# Patient Record
Sex: FEMALE | Race: WHITE
[De-identification: names, ages, dates, MRNs, and addresses within clinical notes are randomized per-mention and may not be internally consistent; named-entity substitution may affect disease eponyms.]

---

## 2018-07-10 ENCOUNTER — HOSPITAL ENCOUNTER (OUTPATIENT)
Dept: HOSPITAL 47 - RADXRMAIN | Age: 28
Discharge: HOME | End: 2018-07-10
Payer: COMMERCIAL

## 2018-07-10 DIAGNOSIS — M54.2: Primary | ICD-10-CM

## 2018-07-10 PROCEDURE — 72050 X-RAY EXAM NECK SPINE 4/5VWS: CPT

## 2018-07-10 NOTE — XR
Cervical spine

 

HISTORY: Pain

 

5 views of cervical spine

 

There is no significant foraminal encroachment. Cervical vertebral bodies show preserved height and b
one mineralization. There is reversal the normal cervical lordosis possibly due to muscle spasm. Mini
mal anterolisthesis grade 1 C2-3. Disc spaces relatively maintained. Prevertebral soft tissues are no
rmal.

 

IMPRESSION: Loss of lordosis, consider muscle spasm, cervical MRI may be of benefit.

## 2025-05-18 ENCOUNTER — HOSPITAL ENCOUNTER (INPATIENT)
Dept: HOSPITAL 47 - EC | Age: 35
LOS: 1 days | Discharge: HOME | DRG: 198 | End: 2025-05-19
Attending: HOSPITALIST | Admitting: HOSPITALIST
Payer: COMMERCIAL

## 2025-05-18 DIAGNOSIS — E83.42: ICD-10-CM

## 2025-05-18 DIAGNOSIS — Z79.899: ICD-10-CM

## 2025-05-18 DIAGNOSIS — R51.9: ICD-10-CM

## 2025-05-18 DIAGNOSIS — Z82.49: ICD-10-CM

## 2025-05-18 DIAGNOSIS — Z79.82: ICD-10-CM

## 2025-05-18 DIAGNOSIS — F17.200: ICD-10-CM

## 2025-05-18 DIAGNOSIS — K76.9: ICD-10-CM

## 2025-05-18 DIAGNOSIS — F32.A: ICD-10-CM

## 2025-05-18 DIAGNOSIS — Z88.0: ICD-10-CM

## 2025-05-18 DIAGNOSIS — I20.0: Primary | ICD-10-CM

## 2025-05-18 DIAGNOSIS — R74.01: ICD-10-CM

## 2025-05-18 LAB
ALBUMIN SERPL-MCNC: 5.1 G/DL (ref 3.5–5)
ALP SERPL-CCNC: 180 U/L (ref 38–126)
ALT SERPL-CCNC: 47 U/L (ref 4–34)
ANION GAP SERPL CALC-SCNC: 18 MMOL/L
APTT BLD: 21.9 SEC (ref 22–30)
AST SERPL-CCNC: 72 U/L (ref 14–36)
BASOPHILS # BLD AUTO: 0.03 10*3/UL (ref 0–0.1)
BASOPHILS NFR BLD AUTO: 0.2 %
BUN SERPL-SCNC: 8 MG/DL (ref 7–17)
CALCIUM SPEC-MCNC: 10.9 MG/DL (ref 8.4–10.2)
CHLORIDE SERPL-SCNC: 103 MMOL/L (ref 98–107)
CO2 SERPL-SCNC: 18 MMOL/L (ref 22–30)
ERYTHROCYTE [DISTWIDTH] IN BLOOD BY AUTOMATED COUNT: 4.42 10*6/UL (ref 4.1–5.2)
ERYTHROCYTE [DISTWIDTH] IN BLOOD: 14.2 % (ref 11.5–14.5)
GLUCOSE SERPL-MCNC: 91 MG/DL (ref 74–99)
HGB BLD-MCNC: 12.8 G/DL (ref 12–15)
INR PPP: 1.1 (ref ?–1.2)
LYMPHOCYTES # SPEC AUTO: 2.56 10*3/UL (ref 0.9–5)
LYMPHOCYTES NFR SPEC AUTO: 19.9 %
MAGNESIUM SPEC-SCNC: 1.5 MG/DL (ref 1.6–2.3)
MCHC RBC AUTO-ENTMCNC: 33.7 G/DL (ref 32–37)
MONOCYTES # BLD AUTO: 0.74 10*3/UL (ref 0.2–1)
MONOCYTES NFR BLD AUTO: 5.7 %
NEUTROPHILS # BLD AUTO: 9.51 10*3/UL (ref 1.8–7.7)
PLATELET # BLD AUTO: 209 10*3/UL (ref 140–440)
POTASSIUM SERPL-SCNC: 3.4 MMOL/L (ref 3.5–5.1)
PT BLD: 11.8 SEC (ref 10–12.5)
SODIUM SERPL-SCNC: 139 MMOL/L (ref 137–145)
WBC # BLD AUTO: 12.87 10*3/UL (ref 4.5–10)

## 2025-05-18 PROCEDURE — 93005 ELECTROCARDIOGRAM TRACING: CPT

## 2025-05-18 PROCEDURE — 81025 URINE PREGNANCY TEST: CPT

## 2025-05-18 PROCEDURE — 83735 ASSAY OF MAGNESIUM: CPT

## 2025-05-18 PROCEDURE — 85730 THROMBOPLASTIN TIME PARTIAL: CPT

## 2025-05-18 PROCEDURE — 99285 EMERGENCY DEPT VISIT HI MDM: CPT

## 2025-05-18 PROCEDURE — 83036 HEMOGLOBIN GLYCOSYLATED A1C: CPT

## 2025-05-18 PROCEDURE — 96368 THER/DIAG CONCURRENT INF: CPT

## 2025-05-18 PROCEDURE — 80061 LIPID PANEL: CPT

## 2025-05-18 PROCEDURE — 36415 COLL VENOUS BLD VENIPUNCTURE: CPT

## 2025-05-18 PROCEDURE — 85025 COMPLETE CBC W/AUTO DIFF WBC: CPT

## 2025-05-18 PROCEDURE — 93458 L HRT ARTERY/VENTRICLE ANGIO: CPT

## 2025-05-18 PROCEDURE — 96366 THER/PROPH/DIAG IV INF ADDON: CPT

## 2025-05-18 PROCEDURE — 71046 X-RAY EXAM CHEST 2 VIEWS: CPT

## 2025-05-18 PROCEDURE — 96361 HYDRATE IV INFUSION ADD-ON: CPT

## 2025-05-18 PROCEDURE — 80053 COMPREHEN METABOLIC PANEL: CPT

## 2025-05-18 PROCEDURE — 84443 ASSAY THYROID STIM HORMONE: CPT

## 2025-05-18 PROCEDURE — 83880 ASSAY OF NATRIURETIC PEPTIDE: CPT

## 2025-05-18 PROCEDURE — 86140 C-REACTIVE PROTEIN: CPT

## 2025-05-18 PROCEDURE — 85610 PROTHROMBIN TIME: CPT

## 2025-05-18 PROCEDURE — 93306 TTE W/DOPPLER COMPLETE: CPT

## 2025-05-18 PROCEDURE — 96375 TX/PRO/DX INJ NEW DRUG ADDON: CPT

## 2025-05-18 PROCEDURE — 84484 ASSAY OF TROPONIN QUANT: CPT

## 2025-05-18 PROCEDURE — 96365 THER/PROPH/DIAG IV INF INIT: CPT

## 2025-05-18 PROCEDURE — 85652 RBC SED RATE AUTOMATED: CPT

## 2025-05-18 RX ADMIN — Medication STA MG: at 14:40

## 2025-05-18 RX ADMIN — KETOROLAC TROMETHAMINE STA MG: 15 INJECTION, SOLUTION INTRAMUSCULAR; INTRAVENOUS at 14:39

## 2025-05-18 RX ADMIN — NITROGLYCERIN PRN MG: 0.4 TABLET SUBLINGUAL at 17:17

## 2025-05-18 RX ADMIN — CEFAZOLIN STA MLS/HR: 330 INJECTION, POWDER, FOR SOLUTION INTRAMUSCULAR; INTRAVENOUS at 12:41

## 2025-05-18 RX ADMIN — ASPIRIN 81 MG CHEWABLE TABLET STA MG: 81 TABLET CHEWABLE at 14:39

## 2025-05-18 RX ADMIN — IBUPROFEN PRN MG: 600 TABLET ORAL at 23:30

## 2025-05-18 RX ADMIN — MORPHINE SULFATE ONE MG: 4 INJECTION, SOLUTION INTRAMUSCULAR; INTRAVENOUS at 12:41

## 2025-05-19 VITALS — TEMPERATURE: 97.7 F

## 2025-05-19 VITALS — DIASTOLIC BLOOD PRESSURE: 67 MMHG | SYSTOLIC BLOOD PRESSURE: 109 MMHG | HEART RATE: 67 BPM

## 2025-05-19 VITALS — RESPIRATION RATE: 16 BRPM

## 2025-05-19 LAB
LDLC SERPL CALC-MCNC: 122.2 MG/DL (ref 0–131)
MAGNESIUM SPEC-SCNC: 1.8 MG/DL (ref 1.6–2.3)
NT-PROBNP SERPL-MCNC: 239 PG/ML

## 2025-05-19 RX ADMIN — BUTALBITAL, ACETAMINOPHEN, AND CAFFEINE PRN EACH: 50; 325; 40 TABLET ORAL at 10:44

## 2025-05-19 RX ADMIN — ASPIRIN 325 MG ORAL TABLET STA: 325 PILL ORAL at 09:39

## 2025-05-19 RX ADMIN — NITROGLYCERIN SCH MLS/HR: 20 INJECTION INTRAVENOUS at 02:26

## 2025-05-19 RX ADMIN — VERAPAMIL HYDROCHLORIDE ONE ML: 2.5 INJECTION, SOLUTION INTRAVENOUS at 11:45

## 2025-05-19 RX ADMIN — MIDAZOLAM ONE MG: 1 INJECTION INTRAMUSCULAR; INTRAVENOUS at 11:44

## 2025-05-19 RX ADMIN — ASPIRIN 325 MG ORAL TABLET SCH MG: 325 PILL ORAL at 10:07

## 2025-05-19 RX ADMIN — FENTANYL CITRATE ONE MCG: 0.05 INJECTION, SOLUTION INTRAMUSCULAR; INTRAVENOUS at 11:45

## 2025-05-19 RX ADMIN — HEPARIN SODIUM ONE UNIT: 1000 INJECTION, SOLUTION INTRAVENOUS; SUBCUTANEOUS at 11:50

## 2025-05-19 RX ADMIN — ATORVASTATIN CALCIUM STA MG: 80 TABLET, FILM COATED ORAL at 10:07

## 2025-05-19 RX ADMIN — MORPHINE SULFATE STA MG: 2 INJECTION, SOLUTION INTRAMUSCULAR; INTRAVENOUS at 01:29

## 2025-05-19 RX ADMIN — POTASSIUM CHLORIDE ONE MLS: 14.9 INJECTION, SOLUTION INTRAVENOUS at 11:45

## 2025-05-19 RX ADMIN — CEFAZOLIN SCH MLS/HR: 330 INJECTION, POWDER, FOR SOLUTION INTRAMUSCULAR; INTRAVENOUS at 10:07

## 2025-05-19 RX ADMIN — IOPAMIDOL ONE ML: 612 INJECTION, SOLUTION INTRAVENOUS at 11:57

## 2025-05-19 RX ADMIN — LIDOCAINE HYDROCHLORIDE ONE ML: 10 INJECTION, SOLUTION EPIDURAL; INFILTRATION; INTRACAUDAL; PERINEURAL at 11:44
